# Patient Record
Sex: MALE | Race: WHITE | Employment: FULL TIME | URBAN - METROPOLITAN AREA
[De-identification: names, ages, dates, MRNs, and addresses within clinical notes are randomized per-mention and may not be internally consistent; named-entity substitution may affect disease eponyms.]

---

## 2017-05-24 PROCEDURE — 82043 UR ALBUMIN QUANTITATIVE: CPT | Performed by: FAMILY MEDICINE

## 2017-05-24 PROCEDURE — 82570 ASSAY OF URINE CREATININE: CPT | Performed by: FAMILY MEDICINE

## 2018-07-17 PROCEDURE — 82570 ASSAY OF URINE CREATININE: CPT | Performed by: FAMILY MEDICINE

## 2018-07-17 PROCEDURE — 82043 UR ALBUMIN QUANTITATIVE: CPT | Performed by: FAMILY MEDICINE

## 2018-07-24 PROBLEM — E11.9 TYPE 2 DIABETES MELLITUS WITHOUT COMPLICATION, WITHOUT LONG-TERM CURRENT USE OF INSULIN (HCC): Status: ACTIVE | Noted: 2018-07-24

## 2020-12-05 ENCOUNTER — HOSPITAL ENCOUNTER (INPATIENT)
Facility: HOSPITAL | Age: 60
LOS: 3 days | Discharge: HOME | DRG: 247 | End: 2020-12-08
Attending: EMERGENCY MEDICINE | Admitting: INTERNAL MEDICINE
Payer: COMMERCIAL

## 2020-12-05 ENCOUNTER — APPOINTMENT (OUTPATIENT)
Dept: GENERAL RADIOLOGY | Facility: HOSPITAL | Age: 60
DRG: 247 | End: 2020-12-05
Payer: COMMERCIAL

## 2020-12-05 ENCOUNTER — APPOINTMENT (OUTPATIENT)
Dept: CV DIAGNOSTICS | Facility: HOSPITAL | Age: 60
DRG: 247 | End: 2020-12-05
Attending: INTERNAL MEDICINE
Payer: COMMERCIAL

## 2020-12-05 ENCOUNTER — APPOINTMENT (OUTPATIENT)
Dept: INTERVENTIONAL RADIOLOGY/VASCULAR | Facility: HOSPITAL | Age: 60
DRG: 247 | End: 2020-12-05
Attending: INTERNAL MEDICINE
Payer: COMMERCIAL

## 2020-12-05 DIAGNOSIS — I21.3 ST ELEVATION MYOCARDIAL INFARCTION (STEMI), UNSPECIFIED ARTERY (HCC): Primary | ICD-10-CM

## 2020-12-05 PROCEDURE — 99291 CRITICAL CARE FIRST HOUR: CPT

## 2020-12-05 PROCEDURE — 027135Z DILATION OF CORONARY ARTERY, TWO ARTERIES WITH TWO DRUG-ELUTING INTRALUMINAL DEVICES, PERCUTANEOUS APPROACH: ICD-10-PCS | Performed by: INTERNAL MEDICINE

## 2020-12-05 PROCEDURE — 96365 THER/PROPH/DIAG IV INF INIT: CPT

## 2020-12-05 PROCEDURE — 93010 ELECTROCARDIOGRAM REPORT: CPT | Performed by: INTERNAL MEDICINE

## 2020-12-05 PROCEDURE — 93010 ELECTROCARDIOGRAM REPORT: CPT

## 2020-12-05 PROCEDURE — 71045 X-RAY EXAM CHEST 1 VIEW: CPT | Performed by: EMERGENCY MEDICINE

## 2020-12-05 PROCEDURE — 99152 MOD SED SAME PHYS/QHP 5/>YRS: CPT

## 2020-12-05 PROCEDURE — 4A023N7 MEASUREMENT OF CARDIAC SAMPLING AND PRESSURE, LEFT HEART, PERCUTANEOUS APPROACH: ICD-10-PCS | Performed by: INTERNAL MEDICINE

## 2020-12-05 PROCEDURE — 82962 GLUCOSE BLOOD TEST: CPT

## 2020-12-05 PROCEDURE — 93005 ELECTROCARDIOGRAM TRACING: CPT

## 2020-12-05 PROCEDURE — 96368 THER/DIAG CONCURRENT INF: CPT

## 2020-12-05 PROCEDURE — 85025 COMPLETE CBC W/AUTO DIFF WBC: CPT | Performed by: EMERGENCY MEDICINE

## 2020-12-05 PROCEDURE — 99153 MOD SED SAME PHYS/QHP EA: CPT

## 2020-12-05 PROCEDURE — B211YZZ FLUOROSCOPY OF MULTIPLE CORONARY ARTERIES USING OTHER CONTRAST: ICD-10-PCS | Performed by: INTERNAL MEDICINE

## 2020-12-05 PROCEDURE — 93458 L HRT ARTERY/VENTRICLE ANGIO: CPT

## 2020-12-05 PROCEDURE — 85610 PROTHROMBIN TIME: CPT | Performed by: EMERGENCY MEDICINE

## 2020-12-05 PROCEDURE — 80053 COMPREHEN METABOLIC PANEL: CPT | Performed by: EMERGENCY MEDICINE

## 2020-12-05 PROCEDURE — 84484 ASSAY OF TROPONIN QUANT: CPT | Performed by: EMERGENCY MEDICINE

## 2020-12-05 PROCEDURE — 85730 THROMBOPLASTIN TIME PARTIAL: CPT | Performed by: EMERGENCY MEDICINE

## 2020-12-05 RX ORDER — HEPARIN SODIUM 5000 [USP'U]/ML
5000 INJECTION INTRAVENOUS; SUBCUTANEOUS ONCE
Status: COMPLETED | OUTPATIENT
Start: 2020-12-05 | End: 2020-12-05

## 2020-12-05 RX ORDER — ASPIRIN 81 MG/1
TABLET, CHEWABLE ORAL
Status: COMPLETED
Start: 2020-12-05 | End: 2020-12-05

## 2020-12-05 RX ORDER — HEPARIN SODIUM 10000 [USP'U]/100ML
INJECTION, SOLUTION INTRAVENOUS
Status: COMPLETED
Start: 2020-12-05 | End: 2020-12-05

## 2020-12-05 RX ORDER — LIDOCAINE HYDROCHLORIDE 10 MG/ML
INJECTION, SOLUTION EPIDURAL; INFILTRATION; INTRACAUDAL; PERINEURAL
Status: COMPLETED
Start: 2020-12-05 | End: 2020-12-05

## 2020-12-05 RX ORDER — MIDAZOLAM HYDROCHLORIDE 1 MG/ML
INJECTION INTRAMUSCULAR; INTRAVENOUS
Status: COMPLETED
Start: 2020-12-05 | End: 2020-12-05

## 2020-12-05 RX ORDER — HEPARIN SODIUM AND DEXTROSE 10000; 5 [USP'U]/100ML; G/100ML
INJECTION INTRAVENOUS CONTINUOUS
Status: CANCELLED | OUTPATIENT
Start: 2020-12-05

## 2020-12-05 RX ORDER — NITROGLYCERIN 20 MG/100ML
INJECTION INTRAVENOUS CONTINUOUS
Status: DISCONTINUED | OUTPATIENT
Start: 2020-12-05 | End: 2020-12-07

## 2020-12-05 RX ORDER — NITROGLYCERIN 0.4 MG/1
TABLET SUBLINGUAL
Status: DISCONTINUED
Start: 2020-12-05 | End: 2020-12-05 | Stop reason: ALTCHOICE

## 2020-12-05 RX ORDER — SODIUM CHLORIDE 9 MG/ML
INJECTION, SOLUTION INTRAVENOUS CONTINUOUS
Status: ACTIVE | OUTPATIENT
Start: 2020-12-05 | End: 2020-12-05

## 2020-12-05 RX ORDER — HEPARIN SODIUM AND DEXTROSE 10000; 5 [USP'U]/100ML; G/100ML
1000 INJECTION INTRAVENOUS ONCE
Status: COMPLETED | OUTPATIENT
Start: 2020-12-05 | End: 2020-12-05

## 2020-12-05 RX ORDER — ASPIRIN 81 MG/1
324 TABLET, CHEWABLE ORAL ONCE
Status: DISCONTINUED | OUTPATIENT
Start: 2020-12-05 | End: 2020-12-05

## 2020-12-05 RX ORDER — NITROGLYCERIN 0.4 MG/1
0.4 TABLET SUBLINGUAL EVERY 5 MIN PRN
Status: DISCONTINUED | OUTPATIENT
Start: 2020-12-05 | End: 2020-12-05

## 2020-12-05 RX ORDER — FENOFIBRATE 67 MG/1
67 CAPSULE ORAL DAILY
Status: DISCONTINUED | OUTPATIENT
Start: 2020-12-05 | End: 2020-12-08

## 2020-12-05 RX ORDER — BIVALIRUDIN 250 MG/5ML
INJECTION, POWDER, LYOPHILIZED, FOR SOLUTION INTRAVENOUS
Status: COMPLETED
Start: 2020-12-05 | End: 2020-12-05

## 2020-12-05 RX ORDER — ASPIRIN 81 MG/1
324 TABLET, CHEWABLE ORAL ONCE
Status: COMPLETED | OUTPATIENT
Start: 2020-12-05 | End: 2020-12-05

## 2020-12-05 RX ORDER — HEPARIN SODIUM 5000 [USP'U]/ML
INJECTION, SOLUTION INTRAVENOUS; SUBCUTANEOUS
Status: COMPLETED
Start: 2020-12-05 | End: 2020-12-05

## 2020-12-05 RX ORDER — NITROGLYCERIN 20 MG/100ML
INJECTION INTRAVENOUS
Status: COMPLETED
Start: 2020-12-05 | End: 2020-12-05

## 2020-12-05 RX ORDER — POTASSIUM CITRATE 10 MEQ/1
10 TABLET, EXTENDED RELEASE ORAL
Status: DISCONTINUED | OUTPATIENT
Start: 2020-12-05 | End: 2020-12-05

## 2020-12-05 RX ORDER — NITROGLYCERIN 0.4 MG/1
0.4 TABLET SUBLINGUAL EVERY 5 MIN PRN
Status: DISCONTINUED | OUTPATIENT
Start: 2020-12-05 | End: 2020-12-08

## 2020-12-05 RX ORDER — ASPIRIN 81 MG/1
81 TABLET ORAL DAILY
Status: DISCONTINUED | OUTPATIENT
Start: 2020-12-06 | End: 2020-12-08

## 2020-12-05 RX ORDER — FEBUXOSTAT 40 MG/1
40 TABLET, FILM COATED ORAL DAILY
Status: DISCONTINUED | OUTPATIENT
Start: 2020-12-05 | End: 2020-12-08

## 2020-12-05 NOTE — H&P
[facility] Hospitalist History and Physical      CC: chest pain    PCP: Diamante Diaz MD    History of Present Illness: Patient is a 56year old male with PMH sig for DM, HTN, HL who presented with CP. Pain started about 2 hours prior to arrival in ER.  EKG on arriv ACCU-CHEK FASTCLIX LANCETS Does not apply Misc, SMBG BID-TID, Disp: 102 each, Rfl: 12    •  Potassium Citrate ER 10 MEQ (1080 MG) Oral Tab CR, Take by mouth 3 (three) times daily with meals. , Disp: , Rfl:           Current Meds:  Scheduled:   • ticagrelor reviewed. Radiology:    I independently reviewed the following studies from admit date:      Other studies reviewed:      Assessment/Plan:   Principal Problem:    ST elevation myocardial infarction (STEMI), unspecified artery ([facility] [address]  Active Problems:    S

## 2020-12-05 NOTE — DIETARY NOTE
Nutrition Short Note    Dietitian consult received per cardiac rehab/CHF protocol. Pt to be educated by cardiac rehab staff and encouraged to attend outpatient classes taught by RD. RD available PRN.     Cheli Ovalles MS, RD, LDN

## 2020-12-05 NOTE — ED PROVIDER NOTES
Patient Seen in: [facility] Emergency Department      History   Patient presents with:  Chest Pain Angina    Stated Complaint: chest pain    HPI    Patient is a 25-year-old male comes to emergency room for evaluation of chest pain.   Patient complain Pupils equal round reactive to light and accommodation, extraocular motion is intact, sclerae white, conjunctiva is pink. Oropharynx is unremarkable, no exudate. NECK: Supple, trachea midline, no lymphadenopathy.    LUNG: Lungs clear to auscultation bila DRAW LAVENDER   RAINBOW DRAW LIGHT GREEN   RAINBOW DRAW GOLD   Troponin 0 0.057. BUN 28. Creatinine 1.56  EKG    Rate, intervals and axes as noted on EKG Report. Rate: 48  Rhythm: Sinus Rhythm  Reading: Patient has ST elevation in V1, V2.   Reciprocal ch with anterior wall STEMI on EKG. Cardiac alert was called. Spoke with cardiology, Dr. Zaki Dodge. Spoke with [address] hospitalist, Dr. Mayra Calderon. Patient went directly to Cath Lab. He was given aspirin, nitroglycerin.   Started on heparin d

## 2020-12-05 NOTE — PROCEDURES
[facility]    PATIENT'S NAME: Bin Estella   ATTENDING PHYSICIAN: Cullen Stinson M.D. OPERATING PHYSICIAN: Lieutenant Chas M.D.    PATIENT ACCOUNT#:   [de-identified]    LOCATION:  [address] [place] [address]  MEDICAL RECORD #:   [id number]       DATE OF BI guidewire, the lesion was dilated with a 2.5 x 15 mm balloon. Following this, a 2.75 x 23 mm Xience drug-eluting stent was deployed. There was no residual stenosis. IMPRESSION:    1. Acute myocardial infarction.   2.   Two-vessel coronary artery dise

## 2020-12-05 NOTE — CM/SW NOTE
Patient's wife is at bedside. Not requesting  for any assistance or prayer at this time. Paged back , Rosalba Livingston, to update.

## 2020-12-05 NOTE — PROGRESS NOTES
12/05/20 1339   Clinical Encounter Type   Visited With Health care provider  (KATHRYN Marks)   Routine Visit Follow-up     Follow up from earlier cardiac alert.  [facility] ED reported no needs at that time.     Per KATHRYN Marks patient and patient's

## 2020-12-06 ENCOUNTER — APPOINTMENT (OUTPATIENT)
Dept: CV DIAGNOSTICS | Facility: HOSPITAL | Age: 60
DRG: 247 | End: 2020-12-06
Attending: INTERNAL MEDICINE
Payer: COMMERCIAL

## 2020-12-06 PROCEDURE — 93306 TTE W/DOPPLER COMPLETE: CPT | Performed by: INTERNAL MEDICINE

## 2020-12-06 PROCEDURE — 85027 COMPLETE CBC AUTOMATED: CPT | Performed by: INTERNAL MEDICINE

## 2020-12-06 PROCEDURE — 82962 GLUCOSE BLOOD TEST: CPT

## 2020-12-06 PROCEDURE — 83036 HEMOGLOBIN GLYCOSYLATED A1C: CPT | Performed by: HOSPITALIST

## 2020-12-06 PROCEDURE — 80048 BASIC METABOLIC PNL TOTAL CA: CPT | Performed by: INTERNAL MEDICINE

## 2020-12-06 PROCEDURE — 84484 ASSAY OF TROPONIN QUANT: CPT | Performed by: INTERNAL MEDICINE

## 2020-12-06 PROCEDURE — 83735 ASSAY OF MAGNESIUM: CPT | Performed by: HOSPITALIST

## 2020-12-06 RX ORDER — LISINOPRIL 5 MG/1
5 TABLET ORAL DAILY
Status: DISCONTINUED | OUTPATIENT
Start: 2020-12-06 | End: 2020-12-07

## 2020-12-06 RX ORDER — DEXTROSE MONOHYDRATE 25 G/50ML
50 INJECTION, SOLUTION INTRAVENOUS
Status: DISCONTINUED | OUTPATIENT
Start: 2020-12-06 | End: 2020-12-08

## 2020-12-06 RX ORDER — CARVEDILOL 3.12 MG/1
3.12 TABLET ORAL 2 TIMES DAILY WITH MEALS
Status: DISCONTINUED | OUTPATIENT
Start: 2020-12-06 | End: 2020-12-07

## 2020-12-06 RX ORDER — ROSUVASTATIN CALCIUM 20 MG/1
20 TABLET, COATED ORAL NIGHTLY
Status: DISCONTINUED | OUTPATIENT
Start: 2020-12-06 | End: 2020-12-08

## 2020-12-06 RX ORDER — ATORVASTATIN CALCIUM 40 MG/1
40 TABLET, FILM COATED ORAL NIGHTLY
Status: DISCONTINUED | OUTPATIENT
Start: 2020-12-06 | End: 2020-12-06

## 2020-12-06 NOTE — PROGRESS NOTES
[facility] Cardiology Progress Note        Rehana Hollins Patient Status:  Inpatient    4/3/1960 MRN [id number]   [facility] 6NE-A Attending Gilford Guy,*   Hosp Day # 1 PCP Yovana Messina MD     Subjective: Lab 12/05/20  0830   PTP 12.8   INR 0.93                 Impression:  1. Acute ASWMI - PCI of 100% mid LAD on 12/5/20.  -PCI of 90% distal LCx 12/5/20. Trop 104. Moderate+ atherosclerotic burden.  -echo pending. 2. Diabetes  3. HTN  4. Mixed HL  5.  Ov

## 2020-12-06 NOTE — PROGRESS NOTES
[facility] hospitalist daily note    Pt was seen/examined on 12/6/20    S; no chest pain, no SOB, no abd pain, no nausea  No bleeding  + passing gas and urinating    Medications in Epic      PE    12/06/20  0900   BP: 143/75   Pulse: 69   Resp: 19   Temp:

## 2020-12-06 NOTE — PLAN OF CARE
1015 Received from cath lab, A/Ox4, R groin site soft, no hematoma, pedal pulse +2, normal sensation. HR 49-60 SB. Denies chest pain. Recovery per protocol. Voiding well, IVF continued r/t hx CKD.  1500 Ambulate in room, tolerated well. 1600 Ambulate entire

## 2020-12-06 NOTE — PLAN OF CARE
Ambulating in villatoro without SOB or CP. R groin site with mild bruising, soft, no hematoma. Printed material given on dc post angio and new medications. Ok to transfer to CTU.

## 2020-12-07 PROCEDURE — 82962 GLUCOSE BLOOD TEST: CPT

## 2020-12-07 RX ORDER — CARVEDILOL 6.25 MG/1
6.25 TABLET ORAL 2 TIMES DAILY WITH MEALS
Status: DISCONTINUED | OUTPATIENT
Start: 2020-12-07 | End: 2020-12-08

## 2020-12-07 RX ORDER — LISINOPRIL 5 MG/1
5 TABLET ORAL 2 TIMES DAILY
Status: DISCONTINUED | OUTPATIENT
Start: 2020-12-07 | End: 2020-12-08

## 2020-12-07 NOTE — PAYOR COMM NOTE
--------------  ADMISSION REVIEW     Payor: [facility] [employer] [place] [employer]  Subscriber #:  [id number]  Authorization Number: [id number]    Admit date: 12/5/20  Admit time: 7593       Patient Seen in: [facility] Emergency Department    History   Stated Complaint: is unremarkable, no exudate. NECK: Supple, trachea midline, no lymphadenopathy. LUNG: Lungs clear to auscultation bilaterally, no wheezing, no rales, no rhonchi. CARDIOVASCULAR: Regular rate and rhythm. Normal S1S2. No S3S4 or murmur.   ABDOMEN: Bowel Heparin Sodium (Porcine) 5000 UNIT/ML injection 5,000 Units (5,000 Units Intravenous Given 12/5/20 0842)       MDM      Patient is a 68-year-old male comes emergency room for chest pain. Patient with anterior wall STEMI on EKG.   Cardiac alert was called a 2.75 x 50 mm balloon. Following this, a 3.0 x 23 mm stent was deployed.   There was no residual stenosis.      PERCUTANEOUS CORONARY INTERVENTION OF LEFT CIRCUMFLEX:  Using the same guide catheter and guidewire, the lesion was dilated with a 2.5 x 15 mm today  - Meds per Cards    # DM: hold orals, SSI    # HTN meds in post STEMI period per Cards    # Hx of kidney stones: currently taking uloric daily, not on potassium citrate- can continue in house    Proph:  - SCDs        12/6:    CARDS:    Subjective: coreg     DM2 SSI     HTN, HL: meds ordered. Monitor BP      12/7:    CARDS:  No chest pain or dyspnea. On RA. No groin issues.   Tele negative.     Medications:  • Rosuvastatin Calcium  20 mg Oral Nightly   • carvedilol  3.125 mg Oral BID with meals   • 12/7/2020 6648 Given 5 mg Oral Rosa Isela Keenan RN      Rosuvastatin Calcium (CRESTOR) tab 20 mg     Date Action Dose Route User    12/6/2020 2141 Given 20 mg Oral Melia Willis RN      ticagrelor [facility]) tab 90 mg     Date Action Dose Route User    1

## 2020-12-07 NOTE — PROGRESS NOTES
[facility] Cardiology Progress Note        Mariana Sun Patient Status:  Inpatient    4/3/1960 MRN [id number]   [facility] 6NE-A Attending Cassandra Rm,*   Hosp Day # 2 PCP Nancy Santillan MD     Subjective: 12/05/20  0830   ALT 58   AST 32   ALB 3.7       Recent Labs   Lab 12/05/20  0830 12/06/20  0021   TROP 0.057* 104.000*       Recent Labs   Lab 12/05/20  0830   PTP 12.8   INR 0.93                 Impression:  1.  Acute ASWMI - PCI of 100% mid LAD on 12/5/2

## 2020-12-07 NOTE — PLAN OF CARE
Neuro: Alert and oriented x4  Resp: Clear to auscultation bilaterally. Room air. Denies cough. Cardiac: NSR. Right groin is clean, dry, intact. No edema. GI: WDL  : WDL    POC: Discharge home tomorrow.       Problem: Diabetes/Glucose Control  Goal: Gluc monitoring, monitor vital signs, obtain 12 lead EKG if indicated  - Evaluate effectiveness of antiarrhythmic and heart rate control medications as ordered  - Initiate emergency measures for life threatening arrhythmias  - Monitor electrolytes and administe

## 2020-12-07 NOTE — PLAN OF CARE
Assumed care of pt at [address]. VSS, on RA, NSR, R pedal pulse +2. A&Ox4. ACHS accuchecks. Pt ambulates to bathroom with steady gait. Received transfer orders to [address] [place] [address], pt updated wife on POC. Will continue to monitor until bed is open.

## 2020-12-07 NOTE — CARDIAC REHAB
Cardiac rehab education completed with patient and wife  Patient referred to cardiac rehab  Patient will be called to schedule cardiac rehab  Stent card given to patient

## 2020-12-07 NOTE — PROGRESS NOTES
[facility] hospitalist daily note     Pt was seen/examined on 12/7/20     S; no chest pain, no SOB, no abd pain, no nausea  No bleeding  + passing gas and urinating     Medications in Epic        PE    12/07/20  1241   BP: 104/53   Pulse: 67   Resp: 16   Temp: 98

## 2020-12-07 NOTE — PLAN OF CARE
Received patient as transfer from CCU at 2300. Patient a&ox4. SR on tele. Lungs clear. Denies any pain at present. Right groin angioseal - MERARY, mild bruising, site soft. Accuchecks qid. D/c planning for Tuesday.  Patient updated on poc, oriented to unit, qu

## 2020-12-08 VITALS
RESPIRATION RATE: 16 BRPM | HEIGHT: 68 IN | DIASTOLIC BLOOD PRESSURE: 54 MMHG | WEIGHT: 219.56 LBS | BODY MASS INDEX: 33.28 KG/M2 | SYSTOLIC BLOOD PRESSURE: 111 MMHG | TEMPERATURE: 98 F | OXYGEN SATURATION: 97 % | HEART RATE: 60 BPM

## 2020-12-08 PROCEDURE — 80048 BASIC METABOLIC PNL TOTAL CA: CPT | Performed by: HOSPITALIST

## 2020-12-08 PROCEDURE — 82962 GLUCOSE BLOOD TEST: CPT

## 2020-12-08 RX ORDER — ROSUVASTATIN CALCIUM 20 MG/1
20 TABLET, COATED ORAL NIGHTLY
Qty: 90 TABLET | Refills: 3 | Status: SHIPPED | OUTPATIENT
Start: 2020-12-08 | End: 2022-01-10

## 2020-12-08 RX ORDER — CARVEDILOL 6.25 MG/1
6.25 TABLET ORAL 2 TIMES DAILY WITH MEALS
Qty: 180 TABLET | Refills: 3 | Status: SHIPPED | OUTPATIENT
Start: 2020-12-08 | End: 2021-11-23

## 2020-12-08 RX ORDER — ASPIRIN 81 MG/1
81 TABLET ORAL DAILY
Refills: 0 | Status: SHIPPED | COMMUNITY
Start: 2020-12-08

## 2020-12-08 NOTE — PLAN OF CARE
A/o x4, normal sinus rhythm on tele. Within normal limits on room air. Denies any pain or shortness of breath at this time. R groin cath site soft with no hematoma. Plan of care discussed with pt, verbalized understanding.  Possible discharge in am. Call elsie INTERVENTIONS:  - Continuous cardiac monitoring, monitor vital signs, obtain 12 lead EKG if indicated  - Evaluate effectiveness of antiarrhythmic and heart rate control medications as ordered  - Initiate emergency measures for life threatening arrhythmias

## 2020-12-08 NOTE — PROGRESS NOTES
[facility] Cardiology Progress Note        Rick Shelton Patient Status:  Inpatient    4/3/1960 MRN [id number]   [facility] 6NE-A Attending Rosendo Rodríguez,*   Hosp Day # 3 PCP Darian Rueda MD     Subjective: 138*       Recent Labs   Lab 12/05/20 0830   ALT 58   AST 32   ALB 3.7       Recent Labs   Lab 12/05/20 0830 12/06/20  0021   TROP 0.057* 104.000*       Recent Labs   Lab 12/05/20 0830   PTP 12.8   INR 0.93                 Impression:  1.  Acute ASWMI -

## 2020-12-08 NOTE — PLAN OF CARE
Neuro: Alert and oriented x4  Resp: Clear to auscultation bilaterally. Denies cough. Room air. Cardiac: NSR. No edema. GI: Last reported bowel movement 12/7  : Within normal limits    POC: Discharge to home.       Problem: Diabetes/Glucose Control  Goal cardiac monitoring, monitor vital signs, obtain 12 lead EKG if indicated  - Evaluate effectiveness of antiarrhythmic and heart rate control medications as ordered  - Initiate emergency measures for life threatening arrhythmias  - Monitor electrolytes and a

## 2020-12-09 NOTE — PAYOR COMM NOTE
--------------  12/7-8 CONTINUED STAY REVIEW    Payor: [employer]  Subscriber #:  [id number]  Authorization Number: [id number]    12/7:     CARDS:  No chest pain or dyspnea.  On RA. No groin issues.   Tele negative.     Medications:  • Rosuvastatin tenderness b/l  Neuro CN II-XII grossly intact, moving all extremities, sensation intact, reflexes normal  Psych calm, cooperative  Vascular + b/l radial and DP pulses        Labs  Glucose 108        Assessment/plan     Acute MI s/p PCI  Echo  Seen by card sinus     EKG:        Echo:        Cardiac Cath:        Labs:  HEM:       Recent Labs   Lab 12/05/20  0830 12/06/20 0423   WBC 9.4 11.9*   HGB 16.0 15.6   .0 180. 0         Chem:        Recent Labs   Lab 12/05/20  0830 12/06/20 0423 12/08/20  6723 Date Action Dose Route User    Discharged on 12/8/2020 12/8/2020 0953 Given 90 mg Oral Venu Vazquez RN          Procedures:      Plan:

## 2020-12-09 NOTE — PAYOR COMM NOTE
--------------  DISCHARGE REVIEW    Payor: [employer]  Subscriber #:  [id number]  Authorization Number: [id number]    Admit date: 12/5/20  Admit time:  5587  Discharge Date: 12/8/2020 10:48 AM     Admitting Physician: Shana Lesches, MD Atte

## 2020-12-10 ENCOUNTER — TELEPHONE (OUTPATIENT)
Dept: CARDIAC REHAB | Facility: HOSPITAL | Age: 60
End: 2020-12-10

## 2020-12-10 NOTE — PROGRESS NOTES
[facility] Follow up Call  1. How are you doing now that you're home? I am doing well, thank you. 2. Since leaving the hospital, have you been able to get your prescriptions filled? Yes    3. Do you have any questions about your medications? No      4.  Have

## 2020-12-12 NOTE — DISCHARGE SUMMARY
[facility] Internal Medicine Discharge Summary   Patient ID:  Yoselyn Everett  [id number]  56year old  4/3/1960    Admit date: 12/5/2020    Discharge date and time: 12/8/2020     Attending Physician: No att. providers found     Primary Care Physician: Angy Ritchie tenderness b/l  Neuro CN II-XII grossly intact, moving all extremities, sensation intact, reflexes normal  Psych calm, cooperative  Vascular + b/l radial and DP pulses        Discharge meds  Cardiology prescribed Ticagrelor 90 mg q 12 hours,  Carvedilol 6. understand and agree with therapeutic plan as outlined      Marianna Be MD  [facility] Hospitalist  [phone]

## 2020-12-22 PROBLEM — I25.10 CORONARY ARTERY DISEASE INVOLVING NATIVE CORONARY ARTERY OF NATIVE HEART WITHOUT ANGINA PECTORIS: Status: ACTIVE | Noted: 2020-12-22

## 2021-01-22 ENCOUNTER — CARDPULM VISIT (OUTPATIENT)
Dept: CARDIAC REHAB | Facility: HOSPITAL | Age: 61
End: 2021-01-22
Attending: INTERNAL MEDICINE
Payer: COMMERCIAL

## 2021-01-22 PROCEDURE — 93798 PHYS/QHP OP CAR RHAB W/ECG: CPT

## 2021-02-01 ENCOUNTER — CARDPULM VISIT (OUTPATIENT)
Dept: CARDIAC REHAB | Facility: HOSPITAL | Age: 61
End: 2021-02-01
Attending: INTERNAL MEDICINE
Payer: COMMERCIAL

## 2021-02-01 PROCEDURE — 93798 PHYS/QHP OP CAR RHAB W/ECG: CPT

## 2021-02-03 ENCOUNTER — APPOINTMENT (OUTPATIENT)
Dept: CARDIAC REHAB | Facility: HOSPITAL | Age: 61
End: 2021-02-03
Attending: INTERNAL MEDICINE
Payer: COMMERCIAL

## 2021-02-03 PROCEDURE — 93798 PHYS/QHP OP CAR RHAB W/ECG: CPT

## 2021-02-05 ENCOUNTER — CARDPULM VISIT (OUTPATIENT)
Dept: CARDIAC REHAB | Facility: HOSPITAL | Age: 61
End: 2021-02-05
Attending: INTERNAL MEDICINE
Payer: COMMERCIAL

## 2021-02-05 PROCEDURE — 93798 PHYS/QHP OP CAR RHAB W/ECG: CPT

## 2021-02-08 ENCOUNTER — CARDPULM VISIT (OUTPATIENT)
Dept: CARDIAC REHAB | Facility: HOSPITAL | Age: 61
End: 2021-02-08
Attending: INTERNAL MEDICINE
Payer: COMMERCIAL

## 2021-02-08 PROCEDURE — 93798 PHYS/QHP OP CAR RHAB W/ECG: CPT

## 2021-02-10 ENCOUNTER — APPOINTMENT (OUTPATIENT)
Dept: CARDIAC REHAB | Facility: HOSPITAL | Age: 61
End: 2021-02-10
Attending: INTERNAL MEDICINE
Payer: COMMERCIAL

## 2021-02-10 PROCEDURE — 93798 PHYS/QHP OP CAR RHAB W/ECG: CPT

## 2021-02-12 ENCOUNTER — APPOINTMENT (OUTPATIENT)
Dept: CARDIAC REHAB | Facility: HOSPITAL | Age: 61
End: 2021-02-12
Attending: INTERNAL MEDICINE
Payer: COMMERCIAL

## 2021-02-12 PROCEDURE — 93798 PHYS/QHP OP CAR RHAB W/ECG: CPT

## 2021-02-15 ENCOUNTER — CARDPULM VISIT (OUTPATIENT)
Dept: CARDIAC REHAB | Facility: HOSPITAL | Age: 61
End: 2021-02-15
Attending: INTERNAL MEDICINE
Payer: COMMERCIAL

## 2021-02-15 PROCEDURE — 93798 PHYS/QHP OP CAR RHAB W/ECG: CPT

## 2021-02-17 ENCOUNTER — APPOINTMENT (OUTPATIENT)
Dept: CARDIAC REHAB | Facility: HOSPITAL | Age: 61
End: 2021-02-17
Attending: INTERNAL MEDICINE
Payer: COMMERCIAL

## 2021-02-19 ENCOUNTER — CARDPULM VISIT (OUTPATIENT)
Dept: CARDIAC REHAB | Facility: HOSPITAL | Age: 61
End: 2021-02-19
Attending: INTERNAL MEDICINE
Payer: COMMERCIAL

## 2021-02-19 PROCEDURE — 93798 PHYS/QHP OP CAR RHAB W/ECG: CPT

## 2021-02-22 ENCOUNTER — CARDPULM VISIT (OUTPATIENT)
Dept: CARDIAC REHAB | Facility: HOSPITAL | Age: 61
End: 2021-02-22
Attending: INTERNAL MEDICINE
Payer: COMMERCIAL

## 2021-02-22 PROCEDURE — 93798 PHYS/QHP OP CAR RHAB W/ECG: CPT

## 2021-02-24 ENCOUNTER — CARDPULM VISIT (OUTPATIENT)
Dept: CARDIAC REHAB | Facility: HOSPITAL | Age: 61
End: 2021-02-24
Attending: INTERNAL MEDICINE
Payer: COMMERCIAL

## 2021-02-24 PROCEDURE — 93798 PHYS/QHP OP CAR RHAB W/ECG: CPT

## 2021-02-26 ENCOUNTER — APPOINTMENT (OUTPATIENT)
Dept: CARDIAC REHAB | Facility: HOSPITAL | Age: 61
End: 2021-02-26
Attending: INTERNAL MEDICINE
Payer: COMMERCIAL

## 2021-02-26 PROCEDURE — 93798 PHYS/QHP OP CAR RHAB W/ECG: CPT

## 2021-03-01 ENCOUNTER — CARDPULM VISIT (OUTPATIENT)
Dept: CARDIAC REHAB | Facility: HOSPITAL | Age: 61
End: 2021-03-01
Attending: INTERNAL MEDICINE
Payer: COMMERCIAL

## 2021-03-01 PROCEDURE — 93798 PHYS/QHP OP CAR RHAB W/ECG: CPT

## 2021-03-03 ENCOUNTER — APPOINTMENT (OUTPATIENT)
Dept: CARDIAC REHAB | Facility: HOSPITAL | Age: 61
End: 2021-03-03
Attending: INTERNAL MEDICINE
Payer: COMMERCIAL

## 2021-03-03 PROCEDURE — 93798 PHYS/QHP OP CAR RHAB W/ECG: CPT

## 2021-03-05 ENCOUNTER — APPOINTMENT (OUTPATIENT)
Dept: CARDIAC REHAB | Facility: HOSPITAL | Age: 61
End: 2021-03-05
Attending: INTERNAL MEDICINE
Payer: COMMERCIAL

## 2021-03-05 PROCEDURE — 93798 PHYS/QHP OP CAR RHAB W/ECG: CPT

## 2021-03-08 ENCOUNTER — CARDPULM VISIT (OUTPATIENT)
Dept: CARDIAC REHAB | Facility: HOSPITAL | Age: 61
End: 2021-03-08
Attending: INTERNAL MEDICINE
Payer: COMMERCIAL

## 2021-03-08 PROCEDURE — 93798 PHYS/QHP OP CAR RHAB W/ECG: CPT

## 2021-03-10 ENCOUNTER — APPOINTMENT (OUTPATIENT)
Dept: CARDIAC REHAB | Facility: HOSPITAL | Age: 61
End: 2021-03-10
Attending: INTERNAL MEDICINE
Payer: COMMERCIAL

## 2021-03-12 ENCOUNTER — CARDPULM VISIT (OUTPATIENT)
Dept: CARDIAC REHAB | Facility: HOSPITAL | Age: 61
End: 2021-03-12
Attending: INTERNAL MEDICINE
Payer: COMMERCIAL

## 2021-03-12 PROCEDURE — 93798 PHYS/QHP OP CAR RHAB W/ECG: CPT

## 2021-03-15 ENCOUNTER — APPOINTMENT (OUTPATIENT)
Dept: CARDIAC REHAB | Facility: HOSPITAL | Age: 61
End: 2021-03-15
Attending: INTERNAL MEDICINE
Payer: COMMERCIAL

## 2021-03-17 ENCOUNTER — CARDPULM VISIT (OUTPATIENT)
Dept: CARDIAC REHAB | Facility: HOSPITAL | Age: 61
End: 2021-03-17
Attending: INTERNAL MEDICINE
Payer: COMMERCIAL

## 2021-03-17 PROCEDURE — 93798 PHYS/QHP OP CAR RHAB W/ECG: CPT

## 2021-03-19 ENCOUNTER — APPOINTMENT (OUTPATIENT)
Dept: CARDIAC REHAB | Facility: HOSPITAL | Age: 61
End: 2021-03-19
Attending: INTERNAL MEDICINE
Payer: COMMERCIAL

## 2021-03-19 PROCEDURE — 93798 PHYS/QHP OP CAR RHAB W/ECG: CPT

## 2021-03-20 DIAGNOSIS — Z23 NEED FOR VACCINATION: ICD-10-CM

## 2021-03-22 ENCOUNTER — CARDPULM VISIT (OUTPATIENT)
Dept: CARDIAC REHAB | Facility: HOSPITAL | Age: 61
End: 2021-03-22
Attending: INTERNAL MEDICINE
Payer: COMMERCIAL

## 2021-03-22 PROCEDURE — 93798 PHYS/QHP OP CAR RHAB W/ECG: CPT

## 2021-03-24 ENCOUNTER — CARDPULM VISIT (OUTPATIENT)
Dept: CARDIAC REHAB | Facility: HOSPITAL | Age: 61
End: 2021-03-24
Attending: INTERNAL MEDICINE
Payer: COMMERCIAL

## 2021-03-24 PROCEDURE — 93798 PHYS/QHP OP CAR RHAB W/ECG: CPT

## 2021-03-26 ENCOUNTER — APPOINTMENT (OUTPATIENT)
Dept: CARDIAC REHAB | Facility: HOSPITAL | Age: 61
End: 2021-03-26
Attending: INTERNAL MEDICINE
Payer: COMMERCIAL

## 2021-03-29 ENCOUNTER — APPOINTMENT (OUTPATIENT)
Dept: CARDIAC REHAB | Facility: HOSPITAL | Age: 61
End: 2021-03-29
Attending: INTERNAL MEDICINE
Payer: COMMERCIAL

## 2021-03-31 ENCOUNTER — APPOINTMENT (OUTPATIENT)
Dept: CARDIAC REHAB | Facility: HOSPITAL | Age: 61
End: 2021-03-31
Attending: INTERNAL MEDICINE
Payer: COMMERCIAL

## 2021-03-31 PROCEDURE — 93798 PHYS/QHP OP CAR RHAB W/ECG: CPT

## 2021-04-02 ENCOUNTER — CARDPULM VISIT (OUTPATIENT)
Dept: CARDIAC REHAB | Facility: HOSPITAL | Age: 61
End: 2021-04-02
Attending: INTERNAL MEDICINE
Payer: COMMERCIAL

## 2021-04-02 PROCEDURE — 93798 PHYS/QHP OP CAR RHAB W/ECG: CPT

## 2021-04-05 ENCOUNTER — CARDPULM VISIT (OUTPATIENT)
Dept: CARDIAC REHAB | Facility: HOSPITAL | Age: 61
End: 2021-04-05
Attending: INTERNAL MEDICINE
Payer: COMMERCIAL

## 2021-04-05 PROCEDURE — 93798 PHYS/QHP OP CAR RHAB W/ECG: CPT

## 2021-04-07 ENCOUNTER — APPOINTMENT (OUTPATIENT)
Dept: CARDIAC REHAB | Facility: HOSPITAL | Age: 61
End: 2021-04-07
Attending: INTERNAL MEDICINE
Payer: COMMERCIAL

## 2021-04-07 PROCEDURE — 93798 PHYS/QHP OP CAR RHAB W/ECG: CPT

## 2021-04-09 ENCOUNTER — APPOINTMENT (OUTPATIENT)
Dept: CARDIAC REHAB | Facility: HOSPITAL | Age: 61
End: 2021-04-09
Attending: INTERNAL MEDICINE
Payer: COMMERCIAL

## 2021-04-12 ENCOUNTER — APPOINTMENT (OUTPATIENT)
Dept: CARDIAC REHAB | Facility: HOSPITAL | Age: 61
End: 2021-04-12
Attending: INTERNAL MEDICINE
Payer: COMMERCIAL

## 2021-04-12 PROCEDURE — 93798 PHYS/QHP OP CAR RHAB W/ECG: CPT

## 2021-04-14 ENCOUNTER — CARDPULM VISIT (OUTPATIENT)
Dept: CARDIAC REHAB | Facility: HOSPITAL | Age: 61
End: 2021-04-14
Attending: INTERNAL MEDICINE
Payer: COMMERCIAL

## 2021-04-14 PROCEDURE — 93798 PHYS/QHP OP CAR RHAB W/ECG: CPT

## 2021-04-16 ENCOUNTER — APPOINTMENT (OUTPATIENT)
Dept: CARDIAC REHAB | Facility: HOSPITAL | Age: 61
End: 2021-04-16
Attending: INTERNAL MEDICINE
Payer: COMMERCIAL

## 2021-04-19 ENCOUNTER — APPOINTMENT (OUTPATIENT)
Dept: CARDIAC REHAB | Facility: HOSPITAL | Age: 61
End: 2021-04-19
Attending: INTERNAL MEDICINE
Payer: COMMERCIAL

## 2021-04-21 ENCOUNTER — APPOINTMENT (OUTPATIENT)
Dept: CARDIAC REHAB | Facility: HOSPITAL | Age: 61
End: 2021-04-21
Attending: INTERNAL MEDICINE
Payer: COMMERCIAL

## 2021-04-21 PROCEDURE — 93798 PHYS/QHP OP CAR RHAB W/ECG: CPT

## 2021-04-23 ENCOUNTER — APPOINTMENT (OUTPATIENT)
Dept: CARDIAC REHAB | Facility: HOSPITAL | Age: 61
End: 2021-04-23
Attending: INTERNAL MEDICINE
Payer: COMMERCIAL

## 2021-04-23 PROCEDURE — 93798 PHYS/QHP OP CAR RHAB W/ECG: CPT

## 2021-04-26 ENCOUNTER — APPOINTMENT (OUTPATIENT)
Dept: CARDIAC REHAB | Facility: HOSPITAL | Age: 61
End: 2021-04-26
Attending: INTERNAL MEDICINE
Payer: COMMERCIAL

## 2021-04-26 PROCEDURE — 93798 PHYS/QHP OP CAR RHAB W/ECG: CPT

## 2021-04-28 ENCOUNTER — CARDPULM VISIT (OUTPATIENT)
Dept: CARDIAC REHAB | Facility: HOSPITAL | Age: 61
End: 2021-04-28
Attending: INTERNAL MEDICINE
Payer: COMMERCIAL

## 2021-04-28 PROCEDURE — 93798 PHYS/QHP OP CAR RHAB W/ECG: CPT

## 2021-04-30 ENCOUNTER — CARDPULM VISIT (OUTPATIENT)
Dept: CARDIAC REHAB | Facility: HOSPITAL | Age: 61
End: 2021-04-30
Attending: INTERNAL MEDICINE
Payer: COMMERCIAL

## 2021-04-30 PROCEDURE — 93798 PHYS/QHP OP CAR RHAB W/ECG: CPT

## 2021-05-03 ENCOUNTER — CARDPULM VISIT (OUTPATIENT)
Dept: CARDIAC REHAB | Facility: HOSPITAL | Age: 61
End: 2021-05-03
Attending: INTERNAL MEDICINE
Payer: COMMERCIAL

## 2021-05-03 PROCEDURE — 93798 PHYS/QHP OP CAR RHAB W/ECG: CPT

## 2021-05-05 ENCOUNTER — CARDPULM VISIT (OUTPATIENT)
Dept: CARDIAC REHAB | Facility: HOSPITAL | Age: 61
End: 2021-05-05
Attending: INTERNAL MEDICINE
Payer: COMMERCIAL

## 2021-05-05 PROCEDURE — 93798 PHYS/QHP OP CAR RHAB W/ECG: CPT

## 2021-05-07 ENCOUNTER — APPOINTMENT (OUTPATIENT)
Dept: CARDIAC REHAB | Facility: HOSPITAL | Age: 61
End: 2021-05-07
Payer: COMMERCIAL

## 2021-05-07 ENCOUNTER — APPOINTMENT (OUTPATIENT)
Dept: CARDIAC REHAB | Facility: HOSPITAL | Age: 61
End: 2021-05-07
Attending: INTERNAL MEDICINE
Payer: COMMERCIAL

## 2021-05-10 ENCOUNTER — CARDPULM VISIT (OUTPATIENT)
Dept: CARDIAC REHAB | Facility: HOSPITAL | Age: 61
End: 2021-05-10
Attending: INTERNAL MEDICINE
Payer: COMMERCIAL

## 2021-05-10 PROCEDURE — 93798 PHYS/QHP OP CAR RHAB W/ECG: CPT

## 2021-05-12 ENCOUNTER — CARDPULM VISIT (OUTPATIENT)
Dept: CARDIAC REHAB | Facility: HOSPITAL | Age: 61
End: 2021-05-12
Attending: INTERNAL MEDICINE
Payer: COMMERCIAL

## 2021-05-12 PROCEDURE — 93798 PHYS/QHP OP CAR RHAB W/ECG: CPT

## 2021-10-21 PROBLEM — E11.22 CKD STAGE 3 SECONDARY TO DIABETES (HCC): Status: ACTIVE | Noted: 2021-10-21

## 2021-10-21 PROBLEM — N18.30 CKD STAGE 3 SECONDARY TO DIABETES (HCC): Status: ACTIVE | Noted: 2021-10-21
